# Patient Record
Sex: MALE | Race: WHITE | HISPANIC OR LATINO | Employment: UNEMPLOYED | URBAN - METROPOLITAN AREA
[De-identification: names, ages, dates, MRNs, and addresses within clinical notes are randomized per-mention and may not be internally consistent; named-entity substitution may affect disease eponyms.]

---

## 2018-12-16 ENCOUNTER — HOSPITAL ENCOUNTER (OUTPATIENT)
Facility: HOSPITAL | Age: 28
Discharge: HOME | End: 2018-12-18
Attending: EMERGENCY MEDICINE | Admitting: SURGERY
Payer: COMMERCIAL

## 2018-12-16 DIAGNOSIS — R10.13 EPIGASTRIC ABDOMINAL PAIN: ICD-10-CM

## 2018-12-16 DIAGNOSIS — K35.31 ACUTE APPENDICITIS WITH LOCALIZED PERITONITIS AND GANGRENE, WITHOUT PERFORATION OR ABSCESS: Primary | ICD-10-CM

## 2018-12-16 DIAGNOSIS — R10.31 RIGHT LOWER QUADRANT ABDOMINAL PAIN: ICD-10-CM

## 2018-12-16 LAB
ALBUMIN SERPL BCP-MCNC: 4.4 G/DL
ALP SERPL-CCNC: 76 U/L
ALT SERPL W/O P-5'-P-CCNC: 115 U/L
ANION GAP SERPL CALC-SCNC: 9 MMOL/L
APTT BLDCRRT: 30.4 SEC
AST SERPL-CCNC: 45 U/L
BASOPHILS # BLD AUTO: 0.04 K/UL
BASOPHILS NFR BLD: 0.3 %
BILIRUB SERPL-MCNC: 0.8 MG/DL
BILIRUB UR QL STRIP: NEGATIVE
BUN SERPL-MCNC: 16 MG/DL
CALCIUM SERPL-MCNC: 9.9 MG/DL
CHLORIDE SERPL-SCNC: 104 MMOL/L
CLARITY UR: CLEAR
CO2 SERPL-SCNC: 25 MMOL/L
COLOR UR: YELLOW
CREAT SERPL-MCNC: 1 MG/DL
DIFFERENTIAL METHOD: ABNORMAL
EOSINOPHIL # BLD AUTO: 0.2 K/UL
EOSINOPHIL NFR BLD: 1.5 %
ERYTHROCYTE [DISTWIDTH] IN BLOOD BY AUTOMATED COUNT: 12.6 %
EST. GFR  (AFRICAN AMERICAN): >60 ML/MIN/1.73 M^2
EST. GFR  (NON AFRICAN AMERICAN): >60 ML/MIN/1.73 M^2
GLUCOSE SERPL-MCNC: 108 MG/DL
GLUCOSE UR QL STRIP: NEGATIVE
HCT VFR BLD AUTO: 44.8 %
HGB BLD-MCNC: 16 G/DL
HGB UR QL STRIP: NEGATIVE
INR PPP: 0.9
KETONES UR QL STRIP: NEGATIVE
LEUKOCYTE ESTERASE UR QL STRIP: NEGATIVE
LIPASE SERPL-CCNC: 13 U/L
LYMPHOCYTES # BLD AUTO: 2.6 K/UL
LYMPHOCYTES NFR BLD: 19.9 %
MCH RBC QN AUTO: 31.5 PG
MCHC RBC AUTO-ENTMCNC: 35.7 G/DL
MCV RBC AUTO: 88 FL
MONOCYTES # BLD AUTO: 1 K/UL
MONOCYTES NFR BLD: 7.8 %
NEUTROPHILS # BLD AUTO: 9.2 K/UL
NEUTROPHILS NFR BLD: 70.5 %
NITRITE UR QL STRIP: NEGATIVE
PH UR STRIP: 8 [PH] (ref 5–8)
PLATELET # BLD AUTO: 242 K/UL
PMV BLD AUTO: 10.1 FL
POTASSIUM SERPL-SCNC: 3.5 MMOL/L
PROT SERPL-MCNC: 7.7 G/DL
PROT UR QL STRIP: NEGATIVE
PROTHROMBIN TIME: 10.2 SEC
RBC # BLD AUTO: 5.08 M/UL
SODIUM SERPL-SCNC: 138 MMOL/L
SP GR UR STRIP: 1.01 (ref 1–1.03)
URN SPEC COLLECT METH UR: NORMAL
UROBILINOGEN UR STRIP-ACNC: NEGATIVE EU/DL
WBC # BLD AUTO: 13.04 K/UL

## 2018-12-16 PROCEDURE — 96376 TX/PRO/DX INJ SAME DRUG ADON: CPT

## 2018-12-16 PROCEDURE — 63600175 PHARM REV CODE 636 W HCPCS: Performed by: EMERGENCY MEDICINE

## 2018-12-16 PROCEDURE — 96375 TX/PRO/DX INJ NEW DRUG ADDON: CPT

## 2018-12-16 PROCEDURE — 85730 THROMBOPLASTIN TIME PARTIAL: CPT

## 2018-12-16 PROCEDURE — 25000003 PHARM REV CODE 250: Performed by: EMERGENCY MEDICINE

## 2018-12-16 PROCEDURE — 25500020 PHARM REV CODE 255: Performed by: EMERGENCY MEDICINE

## 2018-12-16 PROCEDURE — 99285 EMERGENCY DEPT VISIT HI MDM: CPT | Mod: 25

## 2018-12-16 PROCEDURE — 83690 ASSAY OF LIPASE: CPT

## 2018-12-16 PROCEDURE — 96361 HYDRATE IV INFUSION ADD-ON: CPT

## 2018-12-16 PROCEDURE — 85025 COMPLETE CBC W/AUTO DIFF WBC: CPT

## 2018-12-16 PROCEDURE — 85610 PROTHROMBIN TIME: CPT

## 2018-12-16 PROCEDURE — 96365 THER/PROPH/DIAG IV INF INIT: CPT

## 2018-12-16 PROCEDURE — 81003 URINALYSIS AUTO W/O SCOPE: CPT

## 2018-12-16 PROCEDURE — 80053 COMPREHEN METABOLIC PANEL: CPT

## 2018-12-16 RX ORDER — ONDANSETRON 2 MG/ML
4 INJECTION INTRAMUSCULAR; INTRAVENOUS
Status: COMPLETED | OUTPATIENT
Start: 2018-12-16 | End: 2018-12-16

## 2018-12-16 RX ORDER — MORPHINE SULFATE 10 MG/ML
4 INJECTION INTRAMUSCULAR; INTRAVENOUS; SUBCUTANEOUS
Status: COMPLETED | OUTPATIENT
Start: 2018-12-16 | End: 2018-12-16

## 2018-12-16 RX ADMIN — MORPHINE SULFATE 4 MG: 10 INJECTION, SOLUTION INTRAMUSCULAR; INTRAVENOUS at 10:12

## 2018-12-16 RX ADMIN — IOHEXOL 75 ML: 350 INJECTION, SOLUTION INTRAVENOUS at 11:12

## 2018-12-16 RX ADMIN — SODIUM CHLORIDE 1000 ML: 0.9 INJECTION, SOLUTION INTRAVENOUS at 10:12

## 2018-12-16 RX ADMIN — ONDANSETRON 4 MG: 2 INJECTION, SOLUTION INTRAMUSCULAR; INTRAVENOUS at 10:12

## 2018-12-17 ENCOUNTER — ANESTHESIA (OUTPATIENT)
Dept: SURGERY | Facility: HOSPITAL | Age: 28
End: 2018-12-17
Payer: COMMERCIAL

## 2018-12-17 ENCOUNTER — ANESTHESIA EVENT (OUTPATIENT)
Dept: SURGERY | Facility: HOSPITAL | Age: 28
End: 2018-12-17
Payer: COMMERCIAL

## 2018-12-17 PROBLEM — R10.31 RIGHT LOWER QUADRANT ABDOMINAL PAIN: Status: ACTIVE | Noted: 2018-12-17

## 2018-12-17 PROBLEM — K35.31 ACUTE APPENDICITIS WITH LOCALIZED PERITONITIS AND GANGRENE, WITHOUT PERFORATION OR ABSCESS: Status: ACTIVE | Noted: 2018-12-17

## 2018-12-17 PROCEDURE — 63600175 PHARM REV CODE 636 W HCPCS: Performed by: EMERGENCY MEDICINE

## 2018-12-17 PROCEDURE — 88304 TISSUE EXAM BY PATHOLOGIST: CPT | Performed by: PATHOLOGY

## 2018-12-17 PROCEDURE — 63600175 PHARM REV CODE 636 W HCPCS: Performed by: NURSE ANESTHETIST, CERTIFIED REGISTERED

## 2018-12-17 PROCEDURE — 25000003 PHARM REV CODE 250: Performed by: EMERGENCY MEDICINE

## 2018-12-17 PROCEDURE — 27201423 OPTIME MED/SURG SUP & DEVICES STERILE SUPPLY: Performed by: SURGERY

## 2018-12-17 PROCEDURE — 25000003 PHARM REV CODE 250: Performed by: SURGERY

## 2018-12-17 PROCEDURE — 96365 THER/PROPH/DIAG IV INF INIT: CPT

## 2018-12-17 PROCEDURE — 88304 TISSUE EXAM BY PATHOLOGIST: CPT | Mod: 26,,, | Performed by: PATHOLOGY

## 2018-12-17 PROCEDURE — 36000709 HC OR TIME LEV III EA ADD 15 MIN: Performed by: SURGERY

## 2018-12-17 PROCEDURE — 25000003 PHARM REV CODE 250: Performed by: NURSE ANESTHETIST, CERTIFIED REGISTERED

## 2018-12-17 PROCEDURE — 71000039 HC RECOVERY, EACH ADD'L HOUR: Performed by: SURGERY

## 2018-12-17 PROCEDURE — 37000008 HC ANESTHESIA 1ST 15 MINUTES: Performed by: SURGERY

## 2018-12-17 PROCEDURE — 36000708 HC OR TIME LEV III 1ST 15 MIN: Performed by: SURGERY

## 2018-12-17 PROCEDURE — 63600175 PHARM REV CODE 636 W HCPCS: Performed by: ANESTHESIOLOGY

## 2018-12-17 PROCEDURE — 71000033 HC RECOVERY, INTIAL HOUR: Performed by: SURGERY

## 2018-12-17 PROCEDURE — G0378 HOSPITAL OBSERVATION PER HR: HCPCS

## 2018-12-17 PROCEDURE — 99220 PR INITIAL OBSERVATION CARE,LEVL III: CPT | Mod: 57,,, | Performed by: SURGERY

## 2018-12-17 PROCEDURE — 37000009 HC ANESTHESIA EA ADD 15 MINS: Performed by: SURGERY

## 2018-12-17 PROCEDURE — 96376 TX/PRO/DX INJ SAME DRUG ADON: CPT

## 2018-12-17 PROCEDURE — 44970 LAPAROSCOPY APPENDECTOMY: CPT | Mod: ,,, | Performed by: SURGERY

## 2018-12-17 RX ORDER — BISACODYL 10 MG
10 SUPPOSITORY, RECTAL RECTAL DAILY PRN
Status: DISCONTINUED | OUTPATIENT
Start: 2018-12-17 | End: 2018-12-18 | Stop reason: HOSPADM

## 2018-12-17 RX ORDER — CEFOXITIN SODIUM 1 G/50ML
2 INJECTION, SOLUTION INTRAVENOUS
Status: COMPLETED | OUTPATIENT
Start: 2018-12-17 | End: 2018-12-17

## 2018-12-17 RX ORDER — ONDANSETRON 2 MG/ML
4 INJECTION INTRAMUSCULAR; INTRAVENOUS
Status: COMPLETED | OUTPATIENT
Start: 2018-12-17 | End: 2018-12-17

## 2018-12-17 RX ORDER — ONDANSETRON 8 MG/1
8 TABLET, ORALLY DISINTEGRATING ORAL EVERY 8 HOURS PRN
Status: DISCONTINUED | OUTPATIENT
Start: 2018-12-17 | End: 2018-12-18 | Stop reason: HOSPADM

## 2018-12-17 RX ORDER — HYDROCODONE BITARTRATE AND ACETAMINOPHEN 5; 325 MG/1; MG/1
1 TABLET ORAL EVERY 4 HOURS PRN
Status: DISCONTINUED | OUTPATIENT
Start: 2018-12-17 | End: 2018-12-18 | Stop reason: HOSPADM

## 2018-12-17 RX ORDER — AMOXICILLIN 250 MG
1 CAPSULE ORAL 2 TIMES DAILY
Status: DISCONTINUED | OUTPATIENT
Start: 2018-12-17 | End: 2018-12-18 | Stop reason: HOSPADM

## 2018-12-17 RX ORDER — MORPHINE SULFATE 10 MG/ML
4 INJECTION INTRAMUSCULAR; INTRAVENOUS; SUBCUTANEOUS EVERY 4 HOURS PRN
Status: DISCONTINUED | OUTPATIENT
Start: 2018-12-17 | End: 2018-12-17

## 2018-12-17 RX ORDER — ACETAMINOPHEN 325 MG/1
650 TABLET ORAL EVERY 6 HOURS PRN
Status: DISCONTINUED | OUTPATIENT
Start: 2018-12-17 | End: 2018-12-18 | Stop reason: HOSPADM

## 2018-12-17 RX ORDER — OXYCODONE HYDROCHLORIDE 5 MG/1
5 TABLET ORAL
Status: DISCONTINUED | OUTPATIENT
Start: 2018-12-17 | End: 2018-12-17 | Stop reason: HOSPADM

## 2018-12-17 RX ORDER — ROCURONIUM BROMIDE 10 MG/ML
INJECTION, SOLUTION INTRAVENOUS
Status: DISCONTINUED | OUTPATIENT
Start: 2018-12-17 | End: 2018-12-17

## 2018-12-17 RX ORDER — METOCLOPRAMIDE HYDROCHLORIDE 5 MG/ML
10 INJECTION INTRAMUSCULAR; INTRAVENOUS EVERY 10 MIN PRN
Status: DISCONTINUED | OUTPATIENT
Start: 2018-12-17 | End: 2018-12-17 | Stop reason: HOSPADM

## 2018-12-17 RX ORDER — SODIUM CHLORIDE 0.9 % (FLUSH) 0.9 %
3 SYRINGE (ML) INJECTION EVERY 8 HOURS
Status: DISCONTINUED | OUTPATIENT
Start: 2018-12-17 | End: 2018-12-17 | Stop reason: HOSPADM

## 2018-12-17 RX ORDER — MORPHINE SULFATE 4 MG/ML
2 INJECTION, SOLUTION INTRAMUSCULAR; INTRAVENOUS EVERY 5 MIN PRN
Status: DISCONTINUED | OUTPATIENT
Start: 2018-12-17 | End: 2018-12-17 | Stop reason: HOSPADM

## 2018-12-17 RX ORDER — NEOSTIGMINE METHYLSULFATE 1 MG/ML
INJECTION, SOLUTION INTRAVENOUS
Status: DISCONTINUED | OUTPATIENT
Start: 2018-12-17 | End: 2018-12-17

## 2018-12-17 RX ORDER — SODIUM CHLORIDE 0.9 % (FLUSH) 0.9 %
3 SYRINGE (ML) INJECTION
Status: DISCONTINUED | OUTPATIENT
Start: 2018-12-17 | End: 2018-12-18 | Stop reason: HOSPADM

## 2018-12-17 RX ORDER — MIDAZOLAM HYDROCHLORIDE 1 MG/ML
INJECTION, SOLUTION INTRAMUSCULAR; INTRAVENOUS
Status: DISCONTINUED | OUTPATIENT
Start: 2018-12-17 | End: 2018-12-17

## 2018-12-17 RX ORDER — GLYCOPYRROLATE 0.2 MG/ML
INJECTION INTRAMUSCULAR; INTRAVENOUS
Status: DISCONTINUED | OUTPATIENT
Start: 2018-12-17 | End: 2018-12-17

## 2018-12-17 RX ORDER — CEFOXITIN SODIUM 1 G/50ML
2 INJECTION, SOLUTION INTRAVENOUS
Status: DISCONTINUED | OUTPATIENT
Start: 2018-12-17 | End: 2018-12-17

## 2018-12-17 RX ORDER — ONDANSETRON 2 MG/ML
4 INJECTION INTRAMUSCULAR; INTRAVENOUS EVERY 8 HOURS PRN
Status: DISCONTINUED | OUTPATIENT
Start: 2018-12-17 | End: 2018-12-18 | Stop reason: HOSPADM

## 2018-12-17 RX ORDER — LACTULOSE 10 G/15ML
20 SOLUTION ORAL EVERY 6 HOURS PRN
Status: DISCONTINUED | OUTPATIENT
Start: 2018-12-17 | End: 2018-12-18 | Stop reason: HOSPADM

## 2018-12-17 RX ORDER — MEPERIDINE HYDROCHLORIDE 50 MG/ML
12.5 INJECTION INTRAMUSCULAR; INTRAVENOUS; SUBCUTANEOUS ONCE AS NEEDED
Status: COMPLETED | OUTPATIENT
Start: 2018-12-17 | End: 2018-12-17

## 2018-12-17 RX ORDER — DIPHENHYDRAMINE HYDROCHLORIDE 50 MG/ML
25 INJECTION INTRAMUSCULAR; INTRAVENOUS EVERY 4 HOURS PRN
Status: DISCONTINUED | OUTPATIENT
Start: 2018-12-17 | End: 2018-12-17 | Stop reason: RX

## 2018-12-17 RX ORDER — LIDOCAINE HYDROCHLORIDE 10 MG/ML
1 INJECTION, SOLUTION EPIDURAL; INFILTRATION; INTRACAUDAL; PERINEURAL ONCE
Status: DISCONTINUED | OUTPATIENT
Start: 2018-12-17 | End: 2018-12-18 | Stop reason: HOSPADM

## 2018-12-17 RX ORDER — FENTANYL CITRATE 50 UG/ML
INJECTION, SOLUTION INTRAMUSCULAR; INTRAVENOUS
Status: DISCONTINUED | OUTPATIENT
Start: 2018-12-17 | End: 2018-12-17

## 2018-12-17 RX ORDER — MORPHINE SULFATE 10 MG/ML
2 INJECTION INTRAMUSCULAR; INTRAVENOUS; SUBCUTANEOUS EVERY 4 HOURS PRN
Status: DISCONTINUED | OUTPATIENT
Start: 2018-12-17 | End: 2018-12-18 | Stop reason: HOSPADM

## 2018-12-17 RX ORDER — MORPHINE SULFATE 10 MG/ML
4 INJECTION INTRAMUSCULAR; INTRAVENOUS; SUBCUTANEOUS
Status: COMPLETED | OUTPATIENT
Start: 2018-12-17 | End: 2018-12-17

## 2018-12-17 RX ORDER — SODIUM CHLORIDE, SODIUM LACTATE, POTASSIUM CHLORIDE, CALCIUM CHLORIDE 600; 310; 30; 20 MG/100ML; MG/100ML; MG/100ML; MG/100ML
INJECTION, SOLUTION INTRAVENOUS CONTINUOUS
Status: DISCONTINUED | OUTPATIENT
Start: 2018-12-17 | End: 2018-12-18 | Stop reason: HOSPADM

## 2018-12-17 RX ORDER — RAMELTEON 8 MG/1
8 TABLET ORAL NIGHTLY PRN
Status: DISCONTINUED | OUTPATIENT
Start: 2018-12-17 | End: 2018-12-18 | Stop reason: HOSPADM

## 2018-12-17 RX ORDER — PROPOFOL 10 MG/ML
VIAL (ML) INTRAVENOUS
Status: DISCONTINUED | OUTPATIENT
Start: 2018-12-17 | End: 2018-12-17

## 2018-12-17 RX ORDER — ONDANSETRON 2 MG/ML
INJECTION INTRAMUSCULAR; INTRAVENOUS
Status: DISCONTINUED | OUTPATIENT
Start: 2018-12-17 | End: 2018-12-17

## 2018-12-17 RX ORDER — DEXTROSE MONOHYDRATE AND SODIUM CHLORIDE 5; .9 G/100ML; G/100ML
INJECTION, SOLUTION INTRAVENOUS CONTINUOUS
Status: DISCONTINUED | OUTPATIENT
Start: 2018-12-17 | End: 2018-12-18 | Stop reason: HOSPADM

## 2018-12-17 RX ORDER — BUPIVACAINE HYDROCHLORIDE 2.5 MG/ML
INJECTION, SOLUTION EPIDURAL; INFILTRATION; INTRACAUDAL
Status: DISCONTINUED | OUTPATIENT
Start: 2018-12-17 | End: 2018-12-17 | Stop reason: HOSPADM

## 2018-12-17 RX ORDER — LIDOCAINE HCL/PF 100 MG/5ML
SYRINGE (ML) INTRAVENOUS
Status: DISCONTINUED | OUTPATIENT
Start: 2018-12-17 | End: 2018-12-17

## 2018-12-17 RX ORDER — DEXAMETHASONE SODIUM PHOSPHATE 4 MG/ML
INJECTION, SOLUTION INTRA-ARTICULAR; INTRALESIONAL; INTRAMUSCULAR; INTRAVENOUS; SOFT TISSUE
Status: DISCONTINUED | OUTPATIENT
Start: 2018-12-17 | End: 2018-12-17

## 2018-12-17 RX ORDER — CHLORHEXIDINE GLUCONATE ORAL RINSE 1.2 MG/ML
10 SOLUTION DENTAL 2 TIMES DAILY
Status: DISCONTINUED | OUTPATIENT
Start: 2018-12-17 | End: 2018-12-18 | Stop reason: HOSPADM

## 2018-12-17 RX ORDER — SODIUM CHLORIDE 9 MG/ML
INJECTION, SOLUTION INTRAVENOUS CONTINUOUS
Status: DISCONTINUED | OUTPATIENT
Start: 2018-12-17 | End: 2018-12-17

## 2018-12-17 RX ORDER — SUCCINYLCHOLINE CHLORIDE 20 MG/ML
INJECTION INTRAMUSCULAR; INTRAVENOUS
Status: DISCONTINUED | OUTPATIENT
Start: 2018-12-17 | End: 2018-12-17

## 2018-12-17 RX ORDER — HYDROCODONE BITARTRATE AND ACETAMINOPHEN 10; 325 MG/1; MG/1
1 TABLET ORAL EVERY 4 HOURS PRN
Status: DISCONTINUED | OUTPATIENT
Start: 2018-12-17 | End: 2018-12-18 | Stop reason: HOSPADM

## 2018-12-17 RX ORDER — CLONIDINE HYDROCHLORIDE 0.1 MG/1
0.1 TABLET ORAL EVERY 6 HOURS PRN
Status: DISCONTINUED | OUTPATIENT
Start: 2018-12-17 | End: 2018-12-18 | Stop reason: HOSPADM

## 2018-12-17 RX ADMIN — ONDANSETRON 4 MG: 2 INJECTION INTRAMUSCULAR; INTRAVENOUS at 01:12

## 2018-12-17 RX ADMIN — CHLORHEXIDINE GLUCONATE 10 ML: 1.2 RINSE ORAL at 08:12

## 2018-12-17 RX ADMIN — STANDARDIZED SENNA CONCENTRATE AND DOCUSATE SODIUM 1 TABLET: 8.6; 5 TABLET ORAL at 08:12

## 2018-12-17 RX ADMIN — ROBINUL 0.8 MG: 0.2 INJECTION INTRAMUSCULAR; INTRAVENOUS at 04:12

## 2018-12-17 RX ADMIN — SUCCINYLCHOLINE CHLORIDE 160 MG: 20 INJECTION, SOLUTION INTRAMUSCULAR; INTRAVENOUS at 03:12

## 2018-12-17 RX ADMIN — MORPHINE SULFATE 2 MG: 4 INJECTION, SOLUTION INTRAMUSCULAR; INTRAVENOUS at 05:12

## 2018-12-17 RX ADMIN — LIDOCAINE HYDROCHLORIDE 100 MG: 20 INJECTION, SOLUTION INTRAVENOUS at 03:12

## 2018-12-17 RX ADMIN — MORPHINE SULFATE 2 MG: 10 INJECTION, SOLUTION INTRAMUSCULAR; INTRAVENOUS at 07:12

## 2018-12-17 RX ADMIN — CEFOXITIN SODIUM 2 G: 1 INJECTION, SOLUTION INTRAVENOUS at 07:12

## 2018-12-17 RX ADMIN — ROCURONIUM BROMIDE 10 MG: 10 INJECTION, SOLUTION INTRAVENOUS at 03:12

## 2018-12-17 RX ADMIN — FENTANYL CITRATE 100 MCG: 50 INJECTION, SOLUTION INTRAMUSCULAR; INTRAVENOUS at 03:12

## 2018-12-17 RX ADMIN — PROPOFOL 30 MG: 10 INJECTION, EMULSION INTRAVENOUS at 03:12

## 2018-12-17 RX ADMIN — ONDANSETRON 4 MG: 2 INJECTION, SOLUTION INTRAMUSCULAR; INTRAVENOUS at 04:12

## 2018-12-17 RX ADMIN — MIDAZOLAM 2 MG: 1 INJECTION INTRAMUSCULAR; INTRAVENOUS at 03:12

## 2018-12-17 RX ADMIN — SODIUM CHLORIDE, SODIUM LACTATE, POTASSIUM CHLORIDE, AND CALCIUM CHLORIDE: .6; .31; .03; .02 INJECTION, SOLUTION INTRAVENOUS at 06:12

## 2018-12-17 RX ADMIN — PROPOFOL 150 MG: 10 INJECTION, EMULSION INTRAVENOUS at 03:12

## 2018-12-17 RX ADMIN — NEOSTIGMINE METHYLSULFATE 4 MG: 1 INJECTION INTRAVENOUS at 04:12

## 2018-12-17 RX ADMIN — HYDROCODONE BITARTRATE AND ACETAMINOPHEN 1 TABLET: 10; 325 TABLET ORAL at 08:12

## 2018-12-17 RX ADMIN — DEXTROSE AND SODIUM CHLORIDE: 5; .9 INJECTION, SOLUTION INTRAVENOUS at 04:12

## 2018-12-17 RX ADMIN — SODIUM CHLORIDE: 0.9 INJECTION, SOLUTION INTRAVENOUS at 11:12

## 2018-12-17 RX ADMIN — MORPHINE SULFATE 4 MG: 10 INJECTION, SOLUTION INTRAMUSCULAR; INTRAVENOUS at 01:12

## 2018-12-17 RX ADMIN — CEFOXITIN SODIUM 2 G: 1 INJECTION, SOLUTION INTRAVENOUS at 01:12

## 2018-12-17 RX ADMIN — MEPERIDINE HYDROCHLORIDE 12.5 MG: 50 INJECTION INTRAMUSCULAR; INTRAVENOUS; SUBCUTANEOUS at 04:12

## 2018-12-17 RX ADMIN — CEFOXITIN SODIUM 2 G: 1 INJECTION, SOLUTION INTRAVENOUS at 02:12

## 2018-12-17 RX ADMIN — ROCURONIUM BROMIDE 20 MG: 10 INJECTION, SOLUTION INTRAVENOUS at 03:12

## 2018-12-17 RX ADMIN — DEXAMETHASONE SODIUM PHOSPHATE 4 MG: 4 INJECTION, SOLUTION INTRA-ARTICULAR; INTRALESIONAL; INTRAMUSCULAR; INTRAVENOUS; SOFT TISSUE at 04:12

## 2018-12-17 NOTE — INTERVAL H&P NOTE
The patient has been examined and the H&P has been reviewed:    I concur with the findings and no changes have occurred since H&P was written.   Consents signed using  services.    Anesthesia/Surgery risks, benefits and alternative options discussed and understood by patient/family.          Active Hospital Problems    Diagnosis  POA    Right lower quadrant abdominal pain [R10.31]  Yes     Acute appendicitis.  He will require appendectomy.  The risk and the benefit of the procedure were fully addressed with the patient.        Resolved Hospital Problems   No resolved problems to display.

## 2018-12-17 NOTE — ED NOTES
Patient sleeping in bed in no acute distress.  Respirations are equal and non-labored.  Vitals stable.

## 2018-12-17 NOTE — OP NOTE
[facility] -   General Surgery  Operative Note    SUMMARY     Date of Procedure: 12/17/2018     Procedure: Procedure(s) (LRB):  APPENDECTOMY, LAPAROSCOPIC (N/A)       Surgeon(s) and Role:     * Elaine Rivera MD - Primary    Assisting Surgeon: None    Pre-Operative Diagnosis: Acute appendicitis with localized peritonitis and gangrene, without perforation or abscess [K35.31]    Post-Operative Diagnosis: Post-Op Diagnosis Codes:     * Acute appendicitis with localized peritonitis and gangrene, without perforation or abscess [K35.31]    Anesthesia: General    Drains, Packs, Catheters: none    Estimated Blood Loss (EBL): 20 mL           Implants: * No implants in log *    Specimens:   Specimen (12h ago, onward)    Start     Ordered    12/17/18 1621  Specimen to Pathology - Surgery  Once     Comments:  AppendixDx:  Acute appendicitis     Start Status   12/17/18 1621 Collected (12/17/18 1621)       12/17/18 1620           Description of the Findings of the Procedure:  Acute appendicitis without evidence of perforation    Significant Surgical Tasks Conducted by the Assistant(s), if Applicable:  None    Complications: No    Indications for Procedure:  28-year-old male proceed to the ER with right lower quadrant abdominal pain with CT evidence of a dilated appendix. Risks and benefits of a laparoscopic appendectomy were discussed in detail and decision was made to proceed with operative intervention.  Consents were obtained.    Procedure in detail: The patient was brought to the operating room and placed on the operating table in the supine position. General endotracheal anesthesia was induced. The left arm was tucked, SCDs were applied, and af banks catheter was placed. The abdomen was prepped and draped in a sterile fashion. A time out was performed to verify patient identification, correct surgical site, IV antibiotic administration, and other pertinent details of the case.     An incision was made at  the umbilicus. The fascia was elevated and the Veress needle was inserted. Proper position was confirmed by aspiration and saline meniscus test. The abdomen was insufflated with carbon dioxide to a pressure of 15 mmHg. The patient tolerated insufflation well. A 5-mm trocar was then inserted.     The laparoscope was inserted and the abdomen inspected. No injuries from the initial trocar placement were noted. Under direct visualization, a 5-mm trocar were inserted above the symphysis pubis and an 11-mm trocar in the left lower quadrant lateral to the rectus muscle. Care was taken to avoid injury to the bladder or inferior epigastric vessels. The table was placed in the Trendelenburg position with the right side elevated.     The cecum was gently grasped with an atraumatic grasper and pulled toward the left upper quadrant. The lateral peritoneal attachment was incised. The appendix was then identified, grasped, and elevated. It was noted to be inflamed without evidence of perforation or abscess. A window was created between the base of the appendix and the mesoappendix.     An endoscopic linear cutting stapler was then used to divide and staple the base of the appendix. It was reloaded with a vascular cartridge and the mesoappendix similarly divided. The appendix was removed.    The appendiceal stump was then irrigated and hemostasis was assured. Fluid was suctioned and no other pathology was identified.     Secondary trocars were removed under direct vision. No bleeding was noted. The left lower quadrant port fascia was closed with a 0-Vicryl suture. The skin for all ports was closed with 4-0 Monocryl subcuticular sutures.     The patient tolerated the procedure well and was taken to the postanesthesia care unit in satisfactory condition.     Condition: Stable    Disposition: PACU - hemodynamically stable.    Attestation: I performed the procedure.    Elaine Rivera

## 2018-12-17 NOTE — HPI
Harish is a 28-year-old  male patient is being seen for the evaluation of right lower quadrant abdominal pain.  According to the patient and his wife, he began to notice epigastric pain about a day and half ago which progressed and now localized in the right lower quadrant of the abdomen. He denies of any fever and chills.  He does complain of nausea.  He denies any previous surgery. He has no known drug allergies.

## 2018-12-17 NOTE — H&P
[facility] -   General Surgery  History & Physical    Patient Name: Harish López  MRN: [id number]  Admission Date: 12/16/2018  Attending Physician: Nolan Carty MD   Primary Care Provider: Primary Doctor No    Patient information was obtained from patient and ER records.     Subjective:     Chief Complaint/Reason for Admission:  Acute appendicitis    History of Present Illness: Harish is a 28-year-old  male patient is being seen for the evaluation of right lower quadrant abdominal pain.  According to the patient and his wife, he began to notice epigastric pain about a day and half ago which progressed and now localized in the right lower quadrant of the abdomen. He denies of any fever and chills.  He does complain of nausea.  He denies any previous surgery. He has no known drug allergies.    No current facility-administered medications on file prior to encounter.      No current outpatient medications on file prior to encounter.       Review of patient's allergies indicates:  No Known Allergies    History reviewed. No pertinent past medical history.  History reviewed. No pertinent surgical history.  Family History     None        Tobacco Use    Smoking status: Never Smoker    Smokeless tobacco: Never Used   Substance and Sexual Activity    Alcohol use: Yes     Comment: occasional    Drug use: No    Sexual activity: Yes     Partners: Female     Review of Systems   Constitutional: Positive for activity change and appetite change. Negative for chills and fever.   HENT: Negative for sore throat and trouble swallowing.    Eyes: Negative.    Respiratory: Negative for cough and shortness of breath.    Cardiovascular: Negative.    Gastrointestinal: Positive for abdominal pain and nausea. Negative for abdominal distention and vomiting.   Endocrine: Negative.    Genitourinary: Negative.    Musculoskeletal: Negative.    Skin: Negative.    Allergic/Immunologic: Negative.    Neurological:  Negative.    Hematological: Does not bruise/bleed easily.   Psychiatric/Behavioral: Negative.      Objective:     Vital Signs (Most Recent):  Temp: 97.5 °F (36.4 °C) (12/16/18 2147)  Pulse: 73 (12/17/18 0532)  Resp: 12 (12/17/18 0532)  BP: (!) 145/70 (12/17/18 0532)  SpO2: 98 % (12/17/18 0532) Vital Signs (24h Range):  Temp:  [97.5 °F (36.4 °C)] 97.5 °F (36.4 °C)  Pulse:  [73-93] 73  Resp:  [11-20] 12  SpO2:  [96 %-100 %] 98 %  BP: (119-173)/(60-88) 145/70     Weight: 87.6 kg (193 lb 2 oz)  Body mass index is 30.25 kg/m².    Physical Exam   Constitutional: He is oriented to person, place, and time. He appears well-developed and well-nourished.   HENT:   Head: Normocephalic.   Right Ear: External ear normal.   Left Ear: External ear normal.   Nose: Nose normal.   Eyes: Pupils are equal, round, and reactive to light. No scleral icterus.   Neck: Normal range of motion. Neck supple. No thyromegaly present.   Cardiovascular: Normal rate, regular rhythm and normal heart sounds.   No murmur heard.  Pulmonary/Chest: Effort normal and breath sounds normal.   Abdominal: Soft. Bowel sounds are normal. He exhibits distension. There is tenderness. There is no guarding.   Musculoskeletal: Normal range of motion.   Lymphadenopathy:     He has no cervical adenopathy.   Neurological: He is alert and oriented to person, place, and time.   Skin: Skin is warm and dry.       Significant Labs:  CBC:   Recent Labs   Lab 12/16/18 2257   WBC 13.04*   RBC 5.08   HGB 16.0   HCT 44.8      MCV 88   MCH 31.5*   MCHC 35.7     CMP:   Recent Labs   Lab 12/16/18 2257      CALCIUM 9.9   ALBUMIN 4.4   PROT 7.7      K 3.5   CO2 25      BUN 16   CREATININE 1.0   ALKPHOS 76   *   AST 45*   BILITOT 0.8       Significant Diagnostics:  I have reviewed and interpreted all pertinent imaging results/findings within the past 24 hours.    Assessment/Plan:     Appendectomy.    VTE Risk Mitigation (From admission, onward)         Ordered     Reason for No Pharmacological VTE Prophylaxis  Once      12/17/18 0255          Nolan Carty MD  General Surgery  [facility] [place]

## 2018-12-17 NOTE — TRANSFER OF CARE
"Anesthesia Transfer of Care Note    Patient: Harish López    Procedure(s) Performed: Procedure(s) (LRB):  APPENDECTOMY, LAPAROSCOPIC (N/A)    Patient location: PACU    Anesthesia Type: general    Transport from OR: Transported from OR on room air with adequate spontaneous ventilation    Post pain: adequate analgesia    Post assessment: no apparent anesthetic complications    Post vital signs: stable    Level of consciousness: awake    Nausea/Vomiting: no nausea/vomiting    Complications: none    Transfer of care protocol was followed      Last vitals:   Visit Vitals  BP (!) 144/78   Pulse 93   Temp 36.7 °C (98 °F) (Temporal)   Resp (!) 23   Ht 5' 7" (1.702 m)   Wt 87.6 kg (193 lb 2 oz)   SpO2 99%   BMI 30.25 kg/m²     "

## 2018-12-17 NOTE — HOSPITAL COURSE
12/17/18 - The abdominal CT scan confirms the presence of an acute appendicitis without any complication. To OR for lap appy.    12/18/2018 - POD# 1 s/p lap appy.  Doing well. Pain is controlled.  Voiding without difficulty. Tolerating clear liquids.

## 2018-12-17 NOTE — ED NOTES
POC reviewed with patient and guest.  No further needs or questions voiced at this time.  Vitals are stable.  No distress is noted.  Bed is in low, locked, position with side rails up x 2 and call remote placed within reach. Awaiting placement to hospital floor.

## 2018-12-17 NOTE — ANESTHESIA PREPROCEDURE EVALUATION
12/17/2018  Harish López is a 28 y.o., male.    Anesthesia Evaluation    I have reviewed the Patient Summary Reports.    I have reviewed the Nursing Notes.   I have reviewed the Medications.     Review of Systems  Anesthesia Hx:  No previous Anesthesia  Denies Family Hx of Anesthesia complications.   Denies Personal Hx of Anesthesia complications.   Social:  Non-Smoker    Cardiovascular:  Cardiovascular Normal     Pulmonary:  Pulmonary Normal        Physical Exam  General:  Well nourished    Airway/Jaw/Neck:  Airway Findings: Mouth Opening: Normal Mallampati: II      Dental:  DENTAL FINDINGS: Normal   Chest/Lungs:  Chest/Lungs Findings: Normal Respiratory Rate     Heart/Vascular:  Heart Findings: Rate: Normal  Rhythm: Regular Rhythm             Anesthesia Plan  Type of Anesthesia, risks & benefits discussed:  Anesthesia Type:  general  Patient's Preference:   Intra-op Monitoring Plan: standard ASA monitors  Intra-op Monitoring Plan Comments:   Post Op Pain Control Plan: multimodal analgesia  Post Op Pain Control Plan Comments:   Induction:   rapid sequence  Beta Blocker:  Patient is not currently on a Beta-Blocker (No further documentation required).       Informed Consent: Patient understands risks and agrees with Anesthesia plan.  Questions answered.   ASA Score: 2     Day of Surgery Review of History & Physical: I have interviewed and examined the patient. I have reviewed the patient's H&P dated:  There are no significant changes.          Ready For Surgery From Anesthesia Perspective.

## 2018-12-17 NOTE — ANESTHESIA POSTPROCEDURE EVALUATION
"Anesthesia Post Evaluation    Patient: Harish López    Procedure(s) Performed: Procedure(s) (LRB):  APPENDECTOMY, LAPAROSCOPIC (N/A)    Final Anesthesia Type: general  Patient location during evaluation: PACU  Patient participation: Yes- Able to Participate  Level of consciousness: awake and alert  Post-procedure vital signs: reviewed and stable  Pain management: adequate  Airway patency: patent  PONV status at discharge: No PONV  Anesthetic complications: no      Cardiovascular status: blood pressure returned to baseline  Respiratory status: unassisted and spontaneous ventilation  Hydration status: euvolemic  Follow-up not needed.        Visit Vitals  BP (!) 144/78   Pulse 93   Temp 36.7 °C (98 °F) (Temporal)   Resp (!) 23   Ht 5' 7" (1.702 m)   Wt 87.6 kg (193 lb 2 oz)   SpO2 99%   BMI 30.25 kg/m²       Pain/Neptali Score: Pain Rating Prior to Med Admin: 8 (12/17/2018  7:11 AM)  Pain Rating Post Med Admin: 4 (12/17/2018  7:41 AM)        "

## 2018-12-17 NOTE — ED PROVIDER NOTES
SCRIBE #1 NOTE: I, Venecia Fu, am scribing for, and in the presence of, Gt Villasenor Jr., MD. I have scribed the entire note.      History      Chief Complaint   Patient presents with    Abdominal Pain     mid gastric abd pain and vomiting that began today, worsening throughout the day, now hurting in L side/flank       Review of patient's allergies indicates:  No Known Allergies     HPI   HPI    12/16/2018, 10:48 PM   History obtained from the patient   History limited secondary to language barrier.       History of Present Illness: Harish López is a 28 y.o. male patient who presents to the Emergency Department for evaluation of upper abd pain which onset gradually today. Symptoms are constant and moderate in severity. No mitigating or exacerbating factors reported. Associated sxs include N/V. Patient denies any fever, chills, constipation, hematochezia, dysuria, hematuria, urinary frequency, diarrhea, and all other sxs at this time. No prior tx reported. No further complaints or concerns at this time. History limited secondary to language barrier.         Arrival mode: Personal vehicle    PCP: Primary Doctor No       Past Medical History:  History reviewed. No pertinent past medical history.    Past Surgical History:  History reviewed. No pertinent surgical history.      Family History:  History reviewed. No pertinent family history.    Social History:  Social History     Tobacco Use    Smoking status: Never Smoker    Smokeless tobacco: Never Used   Substance and Sexual Activity    Alcohol use: Yes     Comment: occasional    Drug use: No    Sexual activity: Yes     Partners: Female       ROS   Review of Systems   Constitutional: Negative for activity change, chills, diaphoresis and fever.   HENT: Negative for congestion, rhinorrhea, sneezing, sore throat and trouble swallowing.    Eyes: Negative for pain.   Respiratory: Negative for cough, chest tightness, shortness of breath, wheezing and  stridor.    Cardiovascular: Negative for chest pain, palpitations and leg swelling.   Gastrointestinal: Positive for abdominal pain (upper), nausea and vomiting. Negative for abdominal distention, blood in stool, constipation and diarrhea.   Genitourinary: Negative for difficulty urinating, dysuria, frequency, hematuria and urgency.   Musculoskeletal: Negative for arthralgias, back pain, myalgias, neck pain and neck stiffness.   Skin: Negative for pallor, rash and wound.   Neurological: Negative for dizziness, syncope, weakness, light-headedness, numbness and headaches.   Hematological: Does not bruise/bleed easily.   All other systems reviewed and are negative.    Physical Exam      Initial Vitals [12/16/18 2147]   BP Pulse Resp Temp SpO2   (!) 173/76 82 20 97.5 °F (36.4 °C) 99 %      MAP       --          Physical Exam  Nursing Notes and Vital Signs Reviewed.  Constitutional: Patient is in no acute distress. Well-developed and well-nourished.  Head: Atraumatic. Normocephalic.  Eyes: PERRL. EOM intact. Conjunctivae are not pale. No scleral icterus.  ENT: Mucous membranes are moist. Oropharynx is clear and symmetric.    Neck: Supple. Full ROM. No lymphadenopathy.  Cardiovascular: Regular rate. Regular rhythm. No murmurs, rubs, or gallops. Distal pulses are 2+ and symmetric.  Pulmonary/Chest: No respiratory distress. Clear to auscultation bilaterally. No wheezing or rales.  Abdominal: Soft and non-distended.  There is tenderness to the upper abdomen.  No rebound, guarding, or rigidity. Good bowel sounds.  Musculoskeletal: Moves all extremities. No obvious deformities. No edema. No calf tenderness.  Skin: Warm and dry.  Neurological:  Alert, awake, and appropriate.  Normal speech.  No acute focal neurological deficits are appreciated.  Psychiatric: Normal affect. Good eye contact. Appropriate in content.    ED Course    Procedures  ED Vital Signs:  Vitals:    12/16/18 2147 12/16/18 2233 12/16/18 2332   BP: (!) 173/76  "(!) 150/88 (!) 166/84   Pulse: 82 79 93   Resp: 20 19 13   Temp: 97.5 °F (36.4 °C)     TempSrc: Oral     SpO2: 99% 100% 100%   Weight: 87.6 kg (193 lb 2 oz)     Height: 5' 7" (1.702 m)         Abnormal Lab Results:  Labs Reviewed   CBC W/ AUTO DIFFERENTIAL - Abnormal; Notable for the following components:       Result Value    WBC 13.04 (*)     MCH 31.5 (*)     Gran # (ANC) 9.2 (*)     All other components within normal limits   COMPREHENSIVE METABOLIC PANEL - Abnormal; Notable for the following components:    AST 45 (*)      (*)     All other components within normal limits   PROTIME-INR   APTT   LIPASE   URINALYSIS        All Lab Results:  Results for orders placed or performed during the hospital encounter of 12/16/18   CBC auto differential   Result Value Ref Range    WBC 13.04 (H) 3.90 - 12.70 K/uL    RBC 5.08 4.60 - 6.20 M/uL    Hemoglobin 16.0 14.0 - 18.0 g/dL    Hematocrit 44.8 40.0 - 54.0 %    MCV 88 82 - 98 fL    MCH 31.5 (H) 27.0 - 31.0 pg    MCHC 35.7 32.0 - 36.0 g/dL    RDW 12.6 11.5 - 14.5 %    Platelets 242 150 - 350 K/uL    MPV 10.1 9.2 - 12.9 fL    Gran # (ANC) 9.2 (H) 1.8 - 7.7 K/uL    Lymph # 2.6 1.0 - 4.8 K/uL    Mono # 1.0 0.3 - 1.0 K/uL    Eos # 0.2 0.0 - 0.5 K/uL    Baso # 0.04 0.00 - 0.20 K/uL    Gran% 70.5 38.0 - 73.0 %    Lymph% 19.9 18.0 - 48.0 %    Mono% 7.8 4.0 - 15.0 %    Eosinophil% 1.5 0.0 - 8.0 %    Basophil% 0.3 0.0 - 1.9 %    Differential Method Automated    Comprehensive metabolic panel   Result Value Ref Range    Sodium 138 136 - 145 mmol/L    Potassium 3.5 3.5 - 5.1 mmol/L    Chloride 104 95 - 110 mmol/L    CO2 25 23 - 29 mmol/L    Glucose 108 70 - 110 mg/dL    BUN, Bld 16 6 - 20 mg/dL    Creatinine 1.0 0.5 - 1.4 mg/dL    Calcium 9.9 8.7 - 10.5 mg/dL    Total Protein 7.7 6.0 - 8.4 g/dL    Albumin 4.4 3.5 - 5.2 g/dL    Total Bilirubin 0.8 0.1 - 1.0 mg/dL    Alkaline Phosphatase 76 55 - 135 U/L    AST 45 (H) 10 - 40 U/L     (H) 10 - 44 U/L    Anion Gap 9 8 - 16 " mmol/L    eGFR if African American >60 >60 mL/min/1.73 m^2    eGFR if non African American >60 >60 mL/min/1.73 m^2   Protime-INR   Result Value Ref Range    Prothrombin Time 10.2 9.0 - 12.5 sec    INR 0.9 0.8 - 1.2   APTT   Result Value Ref Range    aPTT 30.4 21.0 - 32.0 sec   Lipase   Result Value Ref Range    Lipase 13 4 - 60 U/L   Urinalysis   Result Value Ref Range    Specimen UA Urine, Clean Catch     Color, UA Yellow Yellow, Straw, Taylor    Appearance, UA Clear Clear    pH, UA 8.0 5.0 - 8.0    Specific Gravity, UA 1.010 1.005 - 1.030    Protein, UA Negative Negative    Glucose, UA Negative Negative    Ketones, UA Negative Negative    Bilirubin (UA) Negative Negative    Occult Blood UA Negative Negative    Nitrite, UA Negative Negative    Urobilinogen, UA Negative <2.0 EU/dL    Leukocytes, UA Negative Negative       Imaging Results:  Imaging Results          CT Abdomen Pelvis With Contrast (Final result)  Result time 12/16/18 23:29:31    Final result by Yimi Hall Jr., MD (12/16/18 23:29:31)                 Impression:      Minimal hazy change about the appendix without abnormal dilation.  Please correlate clinically for symptoms of appendicitis.  No other significant CT abnormality of the abdomen or pelvis.      Electronically signed by: Yimi Hall Jr., MD  Date:    12/16/2018  Time:    23:29             Narrative:    EXAMINATION:  CT ABDOMEN PELVIS WITH CONTRAST    CLINICAL HISTORY:  Abdominal distension;    TECHNIQUE:  Low dose axial images, sagittal and coronal reformations were obtained from the lung bases to the pubic symphysis before and following the IV administration of 75 mL of Omnipaque 350 .  Oral contrast was not administered..  All CT scans at this facility use dose modulation, iterative reconstruction and/or weight based dosing when appropriate to reduce radiation dose to as low as reasonably achievable.    COMPARISON:  None    FINDINGS:  Abdomen:    - Lung bases: No infiltrates and no  nodules.    - Liver: No focal mass.    - Gallbladder: No calcified gallstones.    - Bile Ducts: No evidence of intra or extra hepatic biliary ductal dilation.    - Spleen: Negative.    - Kidneys: No mass or hydronephrosis.    - Adrenals: Unremarkable.    - Pancreas: No mass or peripancreatic fat stranding.    - Retroperitoneum:  No significant adenopathy.    - Vascular: No abdominal aortic aneurysm.    - Abdominal wall:  Unremarkable.    Pelvis:    No pelvic mass, adenopathy, or free fluid.    Bowel/Mesentery:    The appendix measures 7 mm.  There is minimal adjacent hazy change within the fat.  The distal esophagus and stomach are not unusual in CT appearance.  Small bowel and large bowel loops are normal in caliber.  No bowel wall thickening.    Bones:  No acute osseous abnormality and no suspicious lytic or blastic lesion.                               X-Ray Chest AP Portable (Final result)  Result time 12/16/18 23:01:18    Final result by Yimi Hall Jr., MD (12/16/18 23:01:18)                 Impression:      No acute abnormality.      Electronically signed by: Yimi Hall Jr., MD  Date:    12/16/2018  Time:    23:01             Narrative:    EXAMINATION:  XR CHEST AP PORTABLE    CLINICAL HISTORY:  . Epigastric pain    TECHNIQUE:  Single frontal portable view of the chest was performed.    COMPARISON:  None    FINDINGS:  Support devices: None    The lungs are clear, with normal appearance of pulmonary vasculature and no pleural effusion or pneumothorax.    The cardiac silhouette is normal in size. The hilar and mediastinal contours are unremarkable.    Bones are intact.                                      The Emergency Provider reviewed the vital signs and test results, which are outlined above.    ED Discussion     12:40 AM: Discussed case with Dr. Carty (General Surgery). Dr. Carty agrees with current care and management of pt and accepts admission.   Admitting Service: Hospital medicine   Admitting  Physician: Dr. Owen  Admit to: observation    ED Medication(s):  Medications   ceFOXItin 1 gram/50 mL IVPB 2 g (not administered)   morphine injection 4 mg (not administered)   ondansetron injection 4 mg (not administered)   sodium chloride 0.9% bolus 1,000 mL (1,000 mLs Intravenous New Bag 12/16/18 2258)   morphine injection 4 mg (4 mg Intravenous Given 12/16/18 2258)   ondansetron injection 4 mg (4 mg Intravenous Given 12/16/18 2258)   omnipaque 350 iohexol 75 mL (75 mLs Intravenous Given 12/16/18 2318)       Medical Decision Making    Medical Decision Making:   Clinical Tests:   Lab Tests: Ordered and Reviewed  Radiological Study: Ordered and Reviewed           Scribe Attestation:   Scribe #1: I performed the above scribed service and the documentation accurately describes the services I performed. I attest to the accuracy of the note.    Attending:   Physician Attestation Statement for Scribe #1: I, Gt Villasenor Jr., MD, personally performed the services described in this documentation, as scribed by Venecia Fu, in my presence, and it is both accurate and complete.          Clinical Impression       ICD-10-CM ICD-9-CM   1. Right lower quadrant abdominal pain R10.31 789.03   2. Epigastric abdominal pain R10.13 789.06       Disposition:   Disposition: Admitted  Condition: Fair         Gt Villasenor Jr., MD  12/17/18 4652

## 2018-12-17 NOTE — ANESTHESIA RELEASE NOTE
"Anesthesia Release from PACU Note    Patient: Harish López    Procedure(s) Performed: Procedure(s) (LRB):  APPENDECTOMY, LAPAROSCOPIC (N/A)    Anesthesia type: general    Post pain: Adequate analgesia    Post assessment: no apparent anesthetic complications, tolerated procedure well and no evidence of recall    Last Vitals:   Visit Vitals  BP (!) 144/78   Pulse 93   Temp 36.7 °C (98 °F) (Temporal)   Resp (!) 23   Ht 5' 7" (1.702 m)   Wt 87.6 kg (193 lb 2 oz)   SpO2 99%   BMI 30.25 kg/m²       Post vital signs: stable    Level of consciousness: awake    Nausea/Vomiting: no nausea/no vomiting    Complications: none    Airway Patency: patent    Respiratory: unassisted, spontaneous ventilation    Cardiovascular: stable and blood pressure at baseline    Hydration: euvolemic  "

## 2018-12-17 NOTE — SUBJECTIVE & OBJECTIVE
No current facility-administered medications on file prior to encounter.      No current outpatient medications on file prior to encounter.       Review of patient's allergies indicates:  No Known Allergies    History reviewed. No pertinent past medical history.  History reviewed. No pertinent surgical history.  Family History     None        Tobacco Use    Smoking status: Never Smoker    Smokeless tobacco: Never Used   Substance and Sexual Activity    Alcohol use: Yes     Comment: occasional    Drug use: No    Sexual activity: Yes     Partners: Female     Review of Systems   Constitutional: Positive for activity change and appetite change. Negative for chills and fever.   HENT: Negative for sore throat and trouble swallowing.    Eyes: Negative.    Respiratory: Negative for cough and shortness of breath.    Cardiovascular: Negative.    Gastrointestinal: Positive for abdominal pain and nausea. Negative for abdominal distention and vomiting.   Endocrine: Negative.    Genitourinary: Negative.    Musculoskeletal: Negative.    Skin: Negative.    Allergic/Immunologic: Negative.    Neurological: Negative.    Hematological: Does not bruise/bleed easily.   Psychiatric/Behavioral: Negative.      Objective:     Vital Signs (Most Recent):  Temp: 97.5 °F (36.4 °C) (12/16/18 2147)  Pulse: 73 (12/17/18 0532)  Resp: 12 (12/17/18 0532)  BP: (!) 145/70 (12/17/18 0532)  SpO2: 98 % (12/17/18 0532) Vital Signs (24h Range):  Temp:  [97.5 °F (36.4 °C)] 97.5 °F (36.4 °C)  Pulse:  [73-93] 73  Resp:  [11-20] 12  SpO2:  [96 %-100 %] 98 %  BP: (119-173)/(60-88) 145/70     Weight: 87.6 kg (193 lb 2 oz)  Body mass index is 30.25 kg/m².    Physical Exam   Constitutional: He is oriented to person, place, and time. He appears well-developed and well-nourished.   HENT:   Head: Normocephalic.   Right Ear: External ear normal.   Left Ear: External ear normal.   Nose: Nose normal.   Eyes: Pupils are equal, round, and reactive to light. No scleral  icterus.   Neck: Normal range of motion. Neck supple. No thyromegaly present.   Cardiovascular: Normal rate, regular rhythm and normal heart sounds.   No murmur heard.  Pulmonary/Chest: Effort normal and breath sounds normal.   Abdominal: Soft. Bowel sounds are normal. He exhibits distension. There is tenderness. There is no guarding.   Musculoskeletal: Normal range of motion.   Lymphadenopathy:     He has no cervical adenopathy.   Neurological: He is alert and oriented to person, place, and time.   Skin: Skin is warm and dry.       Significant Labs:  CBC:   Recent Labs   Lab 12/16/18  2257   WBC 13.04*   RBC 5.08   HGB 16.0   HCT 44.8      MCV 88   MCH 31.5*   MCHC 35.7     CMP:   Recent Labs   Lab 12/16/18  2257      CALCIUM 9.9   ALBUMIN 4.4   PROT 7.7      K 3.5   CO2 25      BUN 16   CREATININE 1.0   ALKPHOS 76   *   AST 45*   BILITOT 0.8       Significant Diagnostics:  I have reviewed and interpreted all pertinent imaging results/findings within the past 24 hours.

## 2018-12-18 VITALS
HEIGHT: 67 IN | BODY MASS INDEX: 30.31 KG/M2 | OXYGEN SATURATION: 98 % | HEART RATE: 74 BPM | WEIGHT: 193.13 LBS | TEMPERATURE: 98 F | SYSTOLIC BLOOD PRESSURE: 124 MMHG | RESPIRATION RATE: 16 BRPM | DIASTOLIC BLOOD PRESSURE: 57 MMHG

## 2018-12-18 LAB
ANION GAP SERPL CALC-SCNC: 10 MMOL/L
BUN SERPL-MCNC: 9 MG/DL
CALCIUM SERPL-MCNC: 9.7 MG/DL
CHLORIDE SERPL-SCNC: 101 MMOL/L
CO2 SERPL-SCNC: 25 MMOL/L
CREAT SERPL-MCNC: 0.9 MG/DL
ERYTHROCYTE [DISTWIDTH] IN BLOOD BY AUTOMATED COUNT: 12.1 %
EST. GFR  (AFRICAN AMERICAN): >60 ML/MIN/1.73 M^2
EST. GFR  (NON AFRICAN AMERICAN): >60 ML/MIN/1.73 M^2
GLUCOSE SERPL-MCNC: 111 MG/DL
HCT VFR BLD AUTO: 41 %
HGB BLD-MCNC: 14.1 G/DL
MCH RBC QN AUTO: 30.8 PG
MCHC RBC AUTO-ENTMCNC: 34.4 G/DL
MCV RBC AUTO: 90 FL
PLATELET # BLD AUTO: 226 K/UL
PMV BLD AUTO: 10.5 FL
POTASSIUM SERPL-SCNC: 3.7 MMOL/L
RBC # BLD AUTO: 4.58 M/UL
SODIUM SERPL-SCNC: 136 MMOL/L
WBC # BLD AUTO: 10.02 K/UL

## 2018-12-18 PROCEDURE — G8979 MOBILITY GOAL STATUS: HCPCS | Mod: CH

## 2018-12-18 PROCEDURE — 36415 COLL VENOUS BLD VENIPUNCTURE: CPT

## 2018-12-18 PROCEDURE — 97161 PT EVAL LOW COMPLEX 20 MIN: CPT

## 2018-12-18 PROCEDURE — G8980 MOBILITY D/C STATUS: HCPCS | Mod: CH

## 2018-12-18 PROCEDURE — 97116 GAIT TRAINING THERAPY: CPT

## 2018-12-18 PROCEDURE — 80048 BASIC METABOLIC PNL TOTAL CA: CPT

## 2018-12-18 PROCEDURE — 25000003 PHARM REV CODE 250: Performed by: SURGERY

## 2018-12-18 PROCEDURE — G0378 HOSPITAL OBSERVATION PER HR: HCPCS

## 2018-12-18 PROCEDURE — 99024 POSTOP FOLLOW-UP VISIT: CPT | Mod: ,,, | Performed by: SURGERY

## 2018-12-18 PROCEDURE — G8978 MOBILITY CURRENT STATUS: HCPCS | Mod: CH

## 2018-12-18 PROCEDURE — 99900035 HC TECH TIME PER 15 MIN (STAT)

## 2018-12-18 PROCEDURE — 85027 COMPLETE CBC AUTOMATED: CPT

## 2018-12-18 RX ORDER — HYDROCODONE BITARTRATE AND ACETAMINOPHEN 5; 325 MG/1; MG/1
1 TABLET ORAL EVERY 6 HOURS PRN
Qty: 20 TABLET | Refills: 0 | Status: SHIPPED | OUTPATIENT
Start: 2018-12-18 | End: 2018-12-23

## 2018-12-18 RX ORDER — AMOXICILLIN 250 MG
1 CAPSULE ORAL 2 TIMES DAILY
Qty: 10 TABLET | Refills: 0 | Status: SHIPPED | OUTPATIENT
Start: 2018-12-18 | End: 2018-12-23

## 2018-12-18 RX ADMIN — CHLORHEXIDINE GLUCONATE 10 ML: 1.2 RINSE ORAL at 08:12

## 2018-12-18 RX ADMIN — STANDARDIZED SENNA CONCENTRATE AND DOCUSATE SODIUM 1 TABLET: 8.6; 5 TABLET ORAL at 08:12

## 2018-12-18 RX ADMIN — HYDROCODONE BITARTRATE AND ACETAMINOPHEN 1 TABLET: 10; 325 TABLET ORAL at 03:12

## 2018-12-18 RX ADMIN — HYDROCODONE BITARTRATE AND ACETAMINOPHEN 1 TABLET: 10; 325 TABLET ORAL at 08:12

## 2018-12-18 NOTE — SUBJECTIVE & OBJECTIVE
Interval History: Doing well. Pain is controlled.  Voiding without difficulty. Tolerating clear liquids.    Medications:  Continuous Infusions:   dextrose 5 % and 0.9 % NaCl 125 mL/hr at 12/17/18 0400    lactated ringers 100 mL/hr at 12/17/18 1811     Scheduled Meds:   chlorhexidine  10 mL Mouth/Throat BID    lidocaine (PF) 10 mg/ml (1%)  1 mL Intradermal Once    nozaseptin   Each Nare BID    senna-docusate 8.6-50 mg  1 tablet Oral BID     PRN Meds:acetaminophen, bisacodyl, cloNIDine, HYDROcodone-acetaminophen, HYDROcodone-acetaminophen, influenza, lactulose, morphine, ondansetron, ondansetron, promethazine (PHENERGAN) IVPB, ramelteon, sodium chloride 0.9%     Review of patient's allergies indicates:  No Known Allergies  Objective:     Vital Signs (Most Recent):  Temp: 97.5 °F (36.4 °C) (12/18/18 0725)  Pulse: 74 (12/18/18 0725)  Resp: 16 (12/18/18 0725)  BP: (!) 124/57 (12/18/18 0725)  SpO2: 98 % (12/18/18 0725) Vital Signs (24h Range):  Temp:  [97.1 °F (36.2 °C)-99.1 °F (37.3 °C)] 97.5 °F (36.4 °C)  Pulse:  [] 74  Resp:  [13-23] 16  SpO2:  [94 %-100 %] 98 %  BP: (121-154)/(57-84) 124/57     Weight: 87.6 kg (193 lb 2 oz)  Body mass index is 30.25 kg/m².    Intake/Output - Last 3 Shifts       12/16 0700 - 12/17 0659 12/17 0700 - 12/18 0659 12/18 0700 - 12/19 0659    P.O.  725     I.V. (mL/kg)  1642.7 (18.8)     IV Piggyback 1100 200     Total Intake(mL/kg) 1100 (12.6) 2567.7 (29.3)     Urine (mL/kg/hr)  60 (0)     Stool  0     Blood  20     Total Output  80     Net +1100 +2487.7            Urine Occurrence  3 x     Stool Occurrence  0 x           Physical Exam   Constitutional: He is oriented to person, place, and time. He appears well-developed and well-nourished. No distress.   HENT:   Head: Normocephalic and atraumatic.   Eyes: EOM are normal.   Neck: Neck supple.   Cardiovascular: Normal rate and regular rhythm.   Pulmonary/Chest: Effort normal.   Abdominal: Soft. He exhibits no distension. There is  tenderness (Appropriate incisional).   Incisions clean dry and intact   Musculoskeletal: Normal range of motion.   Neurological: He is alert and oriented to person, place, and time.   Skin: Skin is warm.   Vitals reviewed.      Significant Labs:  CBC:   Recent Labs   Lab 12/18/18  0438   WBC 10.02   RBC 4.58*   HGB 14.1   HCT 41.0      MCV 90   MCH 30.8   MCHC 34.4     BMP:   Recent Labs   Lab 12/18/18  0438   *      K 3.7      CO2 25   BUN 9   CREATININE 0.9   CALCIUM 9.7       Significant Diagnostics:  I have reviewed all pertinent imaging results/findings within the past 24 hours.

## 2018-12-18 NOTE — PROGRESS NOTES
Spoke with MD, pt advanced to full liquid this morning and tolerated well. Will try regular diet for lunch and if tolerated can discharge.

## 2018-12-18 NOTE — DISCHARGE SUMMARY
[facility] -   General Surgery  Discharge Summary      Patient Name: Harish López  MRN: [id number]  Admission Date: 12/16/2018  Hospital Length of Stay: 0 days  Discharge Date and Time:  12/18/2018 8:28 AM  Attending Physician: Elaine Khan MD   Discharging Provider: Elaine Khan MD  Primary Care Provider: Primary Doctor No    HPI:   Harish is a 28-year-old  male patient is being seen for the evaluation of right lower quadrant abdominal pain.  According to the patient and his wife, he began to notice epigastric pain about a day and half ago which progressed and now localized in the right lower quadrant of the abdomen. He denies of any fever and chills.  He does complain of nausea.  He denies any previous surgery. He has no known drug allergies.    Procedure(s) (LRB):  APPENDECTOMY, LAPAROSCOPIC (N/A)      Indwelling Lines/Drains at time of discharge:   Lines/Drains/Airways          None        Hospital Course: 12/17/18 - The abdominal CT scan confirms the presence of an acute appendicitis without any complication. To OR for lap appy.    12/18/2018 - POD# 1 s/p lap appy.  Doing well. Pain is controlled.  Voiding without difficulty. Tolerating clear liquids.    Consults:   Consults (From admission, onward)        Status Ordering Provider     Consult to Case Management/Social Work  Once     Provider:  (Not yet assigned)    Acknowledged ELAINE KHAN          Significant Diagnostic Studies: Labs:   BMP:   Recent Labs   Lab 12/16/18  2257 12/18/18  0438    111*    136   K 3.5 3.7    101   CO2 25 25   BUN 16 9   CREATININE 1.0 0.9   CALCIUM 9.9 9.7    and CMP   Recent Labs   Lab 12/16/18 2257 12/18/18  0438    136   K 3.5 3.7    101   CO2 25 25    111*   BUN 16 9   CREATININE 1.0 0.9   CALCIUM 9.9 9.7   PROT 7.7  --    ALBUMIN 4.4  --    BILITOT 0.8  --    ALKPHOS 76  --    AST 45*  --    *  --    ANIONGAP 9 10   ESTGFRAFRICA >60  >60   EGFRNONAA >60 >60     Radiology: CT scan: CT ABDOMEN PELVIS WITHOUT CONTRAST: Acute appenditicis    Pending Diagnostic Studies:     None        Final Active Diagnoses:    Diagnosis Date Noted POA    PRINCIPAL PROBLEM:  Acute appendicitis with localized peritonitis and gangrene, without perforation or abscess [K35.31] 12/17/2018 Yes    Right lower quadrant abdominal pain [R10.31] 12/17/2018 Yes      Problems Resolved During this Admission:      Discharged Condition: stable    Disposition: Home or Self Care     OK to return to work in 3-5 days    Follow Up:  Follow-up Information     Elaine Rivera MD In 2 weeks.    Specialty:  General Surgery  Why:  For wound re-check  Contact information:  [address]  [facility] [address] [phone]                 Patient Instructions:      Diet Adult Regular     No driving until:   Order Comments: Off narcotic pain medications     Notify your health care provider if you experience any of the following:  temperature >100.4     Notify your health care provider if you experience any of the following:  persistent nausea and vomiting or diarrhea     Notify your health care provider if you experience any of the following:  severe uncontrolled pain     Notify your health care provider if you experience any of the following:  redness, tenderness, or signs of infection (pain, swelling, redness, odor or green/yellow discharge around incision site)     Remove dressing in 24 hours   Order Comments: Remove Band-Aids today  OK to shower today  Remove Steri-Strips in 5 days     Activity as tolerated     Medications:  Reconciled Home Medications:      Medication List      START taking these medications    HYDROcodone-acetaminophen 5-325 mg per tablet  Commonly known as:  NORCO  Take 1 tablet by mouth every 6 (six) hours as needed for Pain.     senna-docusate 8.6-50 mg 8.6-50 mg per tablet  Commonly known as:  PERICOLACE  Take 1 tablet by mouth 2 (two) times daily. for 5 days           Time spent on the discharge of patient: 30 minutes    Elaine Rivera MD  General Surgery  [facility] -

## 2018-12-18 NOTE — PLAN OF CARE
Problem: Adult Inpatient Plan of Care  Goal: Plan of Care Review  Outcome: Ongoing (interventions implemented as appropriate)  Pt remained free of injury during shift, stable condition, pain adequately controlled with PRN medication, tolerated clear liquid diet, no acute distress, receiving IV fluids, dressings to abdomen CDI, and will continue to monitor. 24hr chart review performed.

## 2018-12-18 NOTE — PROGRESS NOTES
Patient tolerated diet, no complaints of N/V. IV removed, tip intact. Prescriptions delivered to bedside. Patient will call when ready to be discharged via wheelchair.

## 2018-12-18 NOTE — ASSESSMENT & PLAN NOTE
POD # 1 s/p lap appy  for acute appendicitis    Advanced to a regular diet  Home today with oral pain medications and stool softener  Follow-up with me in 2 weeks for wound check

## 2018-12-18 NOTE — PLAN OF CARE
Problem: Adult Inpatient Plan of Care  Goal: Plan of Care Review  Outcome: Ongoing (interventions implemented as appropriate)  POC reviewed with the patient and spouse via ADRIANE ([place]an speaking, wife speaks english). Patient resting comfortably after surgery. Lap sites X3, CDI, bandaids in place. IV fluids. IV antibiotics. Patient advanced to clear liquid diet, tolerating well. No N/V. Pain controlled. No falls/adverse events. Chart check complete. Will continue to monitor.

## 2018-12-18 NOTE — PT/OT/SLP EVAL
Physical Therapy Evaluation    Patient Name:  Harish López   MRN:  [id number]    Recommendations:     Discharge Recommendations:  other (see comments)(HOME)   Discharge Equipment Recommendations: none   Barriers to discharge: None    Assessment:     Harish López is a 28 y.o. male admitted with a medical diagnosis of Acute appendicitis with localized peritonitis and gangrene, without perforation or abscess     Recent Surgery: Procedure(s) (LRB):  APPENDECTOMY, LAPAROSCOPIC (N/A) 1 Day Post-Op    Plan:     During this hospitalization, patient to be seen   to address the identified rehab impairments via   and progress toward the following goals:    GOALS:   Multidisciplinary Problems     Physical Therapy Goals        Problem: Physical Therapy Goal    Goal Priority Disciplines Outcome Goal Variances Interventions   Physical Therapy Goal     PT, PT/OT      Description:  PT IS IND WITH ALL GROSS FUNC MOBILITY AND WILL BE D/C FROM P.T.                     · Plan of Care Expires:       Subjective     Chief Complaint: PAIN  Patient/Family Comments/goals: GO HOME  Pain/Comfort:  · Pain Rating 1: 4/10  · Location 1: abdomen  · Pain Rating Post-Intervention 1: 4/10    Patients cultural, spiritual, Yazdanism conflicts given the current situation:      Living Environment:  PT LIVES AT HOME WITH FRIEND AND HAS 7 STEPS TO ENTER HOME AND WAS IND AND DRIVES  Prior to admission, patients level of function was IND.  Equipment used at home: none.  DME owned (not currently used): none.  Upon discharge, patient will have assistance from FRIENDS.    Objective:     Communicated with NURSE RAMIREZ AND Epic CHART REVIEW prior to session.  Patient found SUP IN BED  peripheral IV  upon PT entry to room.    General Precautions: Standard,     Orthopedic Precautions:N/A   Braces: N/A     Exams:  · RLE ROM: WNL  · RLE Strength: WNL  · LLE ROM: WNL  · LLE Strength: WNL    Functional Mobility:  · PT MET IN RM SUP.SIT EOB IND. PT  STOOD WITH NO AD AND GT TRAINED X 250' IND WITH NO LOB. PT RETURNED TO RM T/F TO EOB AND LEFT WITH CALL BELL IN REACH AND ALL NEEDS MET.       AM-PAC 6 CLICK MOBILITY  Total Score:24     Patient left SEATED EOB with call button in reach.    GOALS:   Multidisciplinary Problems     Physical Therapy Goals        Problem: Physical Therapy Goal    Goal Priority Disciplines Outcome Goal Variances Interventions   Physical Therapy Goal     PT, PT/OT      Description:  PT IS IND WITH ALL GROSS FUNC MOBILITY AND WILL BE D/C FROM P.T.                     History:     History reviewed. No pertinent past medical history.    Past Surgical History:   Procedure Laterality Date    APPENDECTOMY, LAPAROSCOPIC N/A 12/17/2018    Performed by Elaine Rivera MD at [facility] OR    LAPAROSCOPIC APPENDECTOMY N/A 12/17/2018    Procedure: APPENDECTOMY, LAPAROSCOPIC;  Surgeon: Elaine Rivera MD;  Location: [facility] OR;  Service: General;  Laterality: N/A;       Clinical Decision Making:     History  Co-morbidities and personal factors that may impact the plan of care Examination  Body Structures and Functions, activity limitations and participation restrictions that may impact the plan of care Clinical Presentation   Decision Making/ Complexity Score   Co-morbidities:   [] Time since onset of injury / illness / exacerbation  [] Status of current condition  []Patient's cognitive status and safety concerns    [] Multiple Medical Problems (see med hx)  Personal Factors:   [] Patient's age  [] Prior Level of function   [] Patient's home situation (environment and family support)  [] Patient's level of motivation  [] Expected progression of patient      HISTORY:(criteria)    [] 65595 - no personal factors/history    [] 26443 - has 1-2 personal factor/comorbidity     [] 63799 - has >3 personal factor/comorbidity     Body Regions:  [] Objective examination findings  [] Head     []  Neck  [] Trunk   [] Upper Extremity  [] Lower Extremity    Body  Systems:  [] For communication ability, affect, cognition, language, and learning style: the assessment of the ability to make needs known, consciousness, orientation (person, place, and time), expected emotional /behavioral responses, and learning preferences (eg, learning barriers, education  needs)  [] For the neuromuscular system: a general assessment of gross coordinated movement (eg, balance, gait, locomotion, transfers, and transitions) and motor function  (motor control and motor learning)  [] For the musculoskeletal system: the assessment of gross symmetry, gross range of motion, gross strength, height, and weight  [] For the integumentary system: the assessment of pliability(texture), presence of scar formation, skin color, and skin integrity  [] For cardiovascular/pulmonary system: the assessment of heart rate, respiratory rate, blood pressure, and edema     Activity limitations:    [] Patient's cognitive status and saf ety concerns          [] Status of current condition      [] Weight bearing restriction  [] Cardiopulmunary Restriction    Participation Restrictions:   [] Goals and goal agreement with the patient     [] Rehab potential (prognosis) and probable outcome      Examination of Body System: (criteria)    [] 60393 - addressing 1-2 elements    [] 44981 - addressing a total of 3 or more elements     [] 15982 -  Addressing a total of 4 or more elements         Clinical Presentation: (criteria)  Choose one     On examination of body system using standardized tests and measures patient presents with (CHOOSE ONE) elements from any of the following: body structures and functions, activity limitations, and/or participation restrictions.  Leading to a clinical presentation that is considered (CHOOSE ONE)                              Clinical Decision Making  (Eval Complexity):  Choose One     Time Tracking:     PT Received On: 12/18/18  PT Start Time: 0835     PT Stop Time: 0900  PT Total Time (min): 25  min     Billable Minutes: Evaluation 15 and Gait Training 10      Ernestine Zee, PT  12/18/2018

## 2018-12-18 NOTE — PROGRESS NOTES
[facility] -   General Surgery  Progress Note    Subjective:     History of Present Illness:  Harish is a 28-year-old  male patient is being seen for the evaluation of right lower quadrant abdominal pain.  According to the patient and his wife, he began to notice epigastric pain about a day and half ago which progressed and now localized in the right lower quadrant of the abdomen. He denies of any fever and chills.  He does complain of nausea.  He denies any previous surgery. He has no known drug allergies.    Post-Op Info:  Procedure(s) (LRB):  APPENDECTOMY, LAPAROSCOPIC (N/A)   1 Day Post-Op     Interval History: Doing well. Pain is controlled.  Voiding without difficulty. Tolerating clear liquids.    Medications:  Continuous Infusions:   dextrose 5 % and 0.9 % NaCl 125 mL/hr at 12/17/18 0400    lactated ringers 100 mL/hr at 12/17/18 1811     Scheduled Meds:   chlorhexidine  10 mL Mouth/Throat BID    lidocaine (PF) 10 mg/ml (1%)  1 mL Intradermal Once    nozaseptin   Each Nare BID    senna-docusate 8.6-50 mg  1 tablet Oral BID     PRN Meds:acetaminophen, bisacodyl, cloNIDine, HYDROcodone-acetaminophen, HYDROcodone-acetaminophen, influenza, lactulose, morphine, ondansetron, ondansetron, promethazine (PHENERGAN) IVPB, ramelteon, sodium chloride 0.9%     Review of patient's allergies indicates:  No Known Allergies  Objective:     Vital Signs (Most Recent):  Temp: 97.5 °F (36.4 °C) (12/18/18 0725)  Pulse: 74 (12/18/18 0725)  Resp: 16 (12/18/18 0725)  BP: (!) 124/57 (12/18/18 0725)  SpO2: 98 % (12/18/18 0725) Vital Signs (24h Range):  Temp:  [97.1 °F (36.2 °C)-99.1 °F (37.3 °C)] 97.5 °F (36.4 °C)  Pulse:  [] 74  Resp:  [13-23] 16  SpO2:  [94 %-100 %] 98 %  BP: (121-154)/(57-84) 124/57     Weight: 87.6 kg (193 lb 2 oz)  Body mass index is 30.25 kg/m².    Intake/Output - Last 3 Shifts       12/16 0700 - 12/17 0659 12/17 0700 - 12/18 0659 12/18 0700 - 12/19 0659    P.O.  725     I.V.  (mL/kg)  1642.7 (18.8)     IV Piggyback 1100 200     Total Intake(mL/kg) 1100 (12.6) 2567.7 (29.3)     Urine (mL/kg/hr)  60 (0)     Stool  0     Blood  20     Total Output  80     Net +1100 +2487.7            Urine Occurrence  3 x     Stool Occurrence  0 x           Physical Exam   Constitutional: He is oriented to person, place, and time. He appears well-developed and well-nourished. No distress.   HENT:   Head: Normocephalic and atraumatic.   Eyes: EOM are normal.   Neck: Neck supple.   Cardiovascular: Normal rate and regular rhythm.   Pulmonary/Chest: Effort normal.   Abdominal: Soft. He exhibits no distension. There is tenderness (Appropriate incisional).   Incisions clean dry and intact   Musculoskeletal: Normal range of motion.   Neurological: He is alert and oriented to person, place, and time.   Skin: Skin is warm.   Vitals reviewed.      Significant Labs:  CBC:   Recent Labs   Lab 12/18/18  0438   WBC 10.02   RBC 4.58*   HGB 14.1   HCT 41.0      MCV 90   MCH 30.8   MCHC 34.4     BMP:   Recent Labs   Lab 12/18/18  0438   *      K 3.7      CO2 25   BUN 9   CREATININE 0.9   CALCIUM 9.7       Significant Diagnostics:  I have reviewed all pertinent imaging results/findings within the past 24 hours.    Assessment/Plan:     * Acute appendicitis with localized peritonitis and gangrene, without perforation or abscess    POD # 1 s/p lap appy  for acute appendicitis    Advanced to a regular diet  Home today with oral pain medications and stool softener  Follow-up with me in 2 weeks for wound check         Elaine Rivera MD  General Surgery  [facility] -

## 2019-01-03 ENCOUNTER — OFFICE VISIT (OUTPATIENT)
Dept: SURGERY | Facility: CLINIC | Age: 29
End: 2019-01-03
Payer: COMMERCIAL

## 2019-01-03 VITALS
DIASTOLIC BLOOD PRESSURE: 75 MMHG | SYSTOLIC BLOOD PRESSURE: 129 MMHG | WEIGHT: 188.06 LBS | BODY MASS INDEX: 29.45 KG/M2 | HEART RATE: 61 BPM | TEMPERATURE: 98 F

## 2019-01-03 DIAGNOSIS — K35.31 ACUTE APPENDICITIS WITH LOCALIZED PERITONITIS AND GANGRENE, WITHOUT PERFORATION OR ABSCESS: Primary | ICD-10-CM

## 2019-01-03 PROBLEM — R10.31 RIGHT LOWER QUADRANT ABDOMINAL PAIN: Status: RESOLVED | Noted: 2018-12-17 | Resolved: 2019-01-03

## 2019-01-03 PROCEDURE — 99212 OFFICE O/P EST SF 10 MIN: CPT | Mod: PBBFAC,PO | Performed by: SURGERY

## 2019-01-03 PROCEDURE — 99024 POSTOP FOLLOW-UP VISIT: CPT | Mod: S$GLB,,, | Performed by: SURGERY

## 2019-01-03 PROCEDURE — 99024 PR POST-OP FOLLOW-UP VISIT: ICD-10-PCS | Mod: S$GLB,,, | Performed by: SURGERY

## 2019-01-03 PROCEDURE — 99999 PR PBB SHADOW E&M-EST. PATIENT-LVL II: CPT | Mod: PBBFAC,,, | Performed by: SURGERY

## 2019-01-03 PROCEDURE — 99999 PR PBB SHADOW E&M-EST. PATIENT-LVL II: ICD-10-PCS | Mod: PBBFAC,,, | Performed by: SURGERY

## 2019-01-03 NOTE — PROGRESS NOTES
General Surgery     Postop Visit Note      Harish López  28 y.o.    Review of patient's allergies indicates:  No Known Allergies    Vitals:    01/03/19 0818   BP: 129/75   Pulse: 61   Temp: 97.9 °F (36.6 °C)       SUBJECTIVE:  28 y.o. male presents s/p lap appy.  Doing well.  No complaints.    OBJECTIVE:  Laparoscopic incisions healing well, no erythema, no drainage, no tenderness with palpation.     Pathology- reviewed; acute appendicitis and periappendicitis without perforation    ASSESSMENT:  Acute appendicitis with localized peritonitis and gangrene, without perforation or abscess     Work excuse provided  Follow-up if symptoms worsen or fail to improve.    Elaine Rivera

## 2019-01-03 NOTE — LETTER
January 3, 2019    Harish López  [address]   [place] [address]         [facility] - General Surgery  [address] [place] [address]  Phone: [phone]  Fax: [phone] January 3, 2019     Patient: Harish López   YOB: 1990   Date of Visit: 1/3/2019       To Whom It May Concern:    It is my medical opinion that Harish López may return to light duty immediately with the following restrictions: no lifting > 20 lbs for 2 weeks. .    If you have any questions or concerns, please don't hesitate to call.    Sincerely,        Elaine Rivera MD

## 2019-01-07 NOTE — INTERVAL H&P NOTE
Patient given fluids, taking fluids well       Cheyanne Le, DIONISIO  01/07/19 2571 The patient has been examined and the H&P has been reviewed:    I concur with the findings and no changes have occurred since H&P was written.        Active Hospital Problems    Diagnosis  POA    Right lower quadrant abdominal pain [R10.31]  Yes     Acute appendicitis.  He will require appendectomy.  The risk and the benefit of the procedure were fully addressed with the patient.        Resolved Hospital Problems   No resolved problems to display.

## (undated) DEVICE — APPLIER CLIP ENDO MED/LG 10MM

## (undated) DEVICE — SEE MEDLINE ITEM 157117

## (undated) DEVICE — NDL SAFETY 22G X 1.5 ECLIPSE

## (undated) DEVICE — COVER OVERHEAD SURG LT BLUE

## (undated) DEVICE — SUT SILK 2-0 STRANDS 30IN

## (undated) DEVICE — TUBE SPEC COLL&TRANSPORT 11ML

## (undated) DEVICE — SUT SILK 0 STRANDS 30IN BLK

## (undated) DEVICE — ELECTRODE REM PLYHSV RETURN 9

## (undated) DEVICE — SWAB CULTURETTE II DUAL

## (undated) DEVICE — MANIFOLD 4 PORT

## (undated) DEVICE — TIP GRASPER FENESTRATED DISP

## (undated) DEVICE — Device

## (undated) DEVICE — DRESSING TRANS 4X4 TEGADERM

## (undated) DEVICE — DRAPE LAP TIBURON 77X122IN

## (undated) DEVICE — ENDOAPTH VAS RELOAD WHITE 2.5

## (undated) DEVICE — SPONGE DERMACEA 4X4IN 12PLY

## (undated) DEVICE — CLOSURE SKIN STERI STRIP 1/2X4

## (undated) DEVICE — ADHESIVE MASTISOL VIAL 48/BX

## (undated) DEVICE — SEE MEDLINE ITEM 157027

## (undated) DEVICE — DISSECTOR EPIX LAPA 5MMX35CM

## (undated) DEVICE — SUT VICRYL PLUS 4-0 P3 18IN

## (undated) DEVICE — SUT VICRYL 3-0 27 SH

## (undated) DEVICE — SOL NS 1000CC

## (undated) DEVICE — GLOVE PROTEXIS HYDROGEL SZ7

## (undated) DEVICE — SYR 30CC LUER LOCK

## (undated) DEVICE — ECHELON FLEX POWERED VAS STPLR

## (undated) DEVICE — IRRIGATOR ENDOSCOPY DISP.